# Patient Record
Sex: MALE | Race: BLACK OR AFRICAN AMERICAN | NOT HISPANIC OR LATINO | Employment: FULL TIME | URBAN - METROPOLITAN AREA
[De-identification: names, ages, dates, MRNs, and addresses within clinical notes are randomized per-mention and may not be internally consistent; named-entity substitution may affect disease eponyms.]

---

## 2023-10-19 ENCOUNTER — OFFICE VISIT (OUTPATIENT)
Dept: FAMILY MEDICINE CLINIC | Facility: CLINIC | Age: 28
End: 2023-10-19
Payer: COMMERCIAL

## 2023-10-19 VITALS
OXYGEN SATURATION: 98 % | SYSTOLIC BLOOD PRESSURE: 112 MMHG | DIASTOLIC BLOOD PRESSURE: 72 MMHG | WEIGHT: 188.2 LBS | HEIGHT: 66 IN | BODY MASS INDEX: 30.25 KG/M2 | TEMPERATURE: 98.9 F | HEART RATE: 80 BPM

## 2023-10-19 DIAGNOSIS — M99.03 SOMATIC DYSFUNCTION OF LUMBAR REGION: ICD-10-CM

## 2023-10-19 DIAGNOSIS — K21.9 GASTROESOPHAGEAL REFLUX DISEASE WITHOUT ESOPHAGITIS: Primary | ICD-10-CM

## 2023-10-19 DIAGNOSIS — G89.29 CHRONIC LEFT-SIDED LOW BACK PAIN WITHOUT SCIATICA: ICD-10-CM

## 2023-10-19 DIAGNOSIS — M99.04 SOMATIC DYSFUNCTION OF SACRAL REGION: ICD-10-CM

## 2023-10-19 DIAGNOSIS — M99.02 SOMATIC DYSFUNCTION OF THORACIC REGION: ICD-10-CM

## 2023-10-19 DIAGNOSIS — M54.50 CHRONIC LEFT-SIDED LOW BACK PAIN WITHOUT SCIATICA: ICD-10-CM

## 2023-10-19 RX ORDER — PANTOPRAZOLE SODIUM 40 MG/1
40 TABLET, DELAYED RELEASE ORAL DAILY
Qty: 30 TABLET | Refills: 2 | Status: SHIPPED | OUTPATIENT
Start: 2023-10-19

## 2023-10-19 NOTE — PROGRESS NOTES
New Patient Office Visit      Patient Name: Bailey Christiansen  : 1995   MRN: [id number]     Chief Complaint:    Chief Complaint   Patient presents with    Annual Exam     Establish care, patient use to see a PCP at      Back Pain     L side mid/ low. Patient had an injury at work about 2 years ago.        History of Present Illness: Bailey Christiansen is a 28 y.o. male who is here today to establish care.  Left abdominal pain and low back pain.    Left upper abdomen - feels like a balloon . Been going on for a while. Comes and goes.maybe worse after snacks. Worse in morning. Feels like something is pushing up. Lasts 3-4 hours. Just started a gas pill. He burped a lot. It helped some.     Low back pain - felt like he had worms because of the movement in his back. Has been doing running and gym workouts. Works at [employer] and feels great. Says sometimes his left side locks. Sometimes when having a stool, it made the back pain worse. He strained his muscle work a long times ago. He did get a shot in the back before and did help. He was given pills and referred physical therapy. Now pain is like /10.     Physical exam: Somatic function noted in lumbar region.  Somatic function noted in hip region.  Dysfunction noted in thoracic region.  Mood and affect appropriate.  Heart exam RRR.  Lung exam CTA bilateral.  Abdominal exam shows normal bowel sounds.  No tenderness to abdominal palpation.      Subjective          Past Medical History: History reviewed. No pertinent past medical history.    Past Surgical History: History reviewed. No pertinent surgical history.    Family History:   Family History   Problem Relation Age of Onset    Hypertension Mother        Social History:   Social History     Socioeconomic History    Marital status: Single   Tobacco Use    Smoking status: Never    Smokeless tobacco: Never   Vaping Use    Vaping Use: Never used   Substance and Sexual Activity    Alcohol use: Yes     Comment:  "Ocassionally    Drug use: Never    Sexual activity: Defer       Medications:     Current Outpatient Medications:     pantoprazole (Protonix) 40 MG EC tablet, Take 1 tablet by mouth Daily., Disp: 30 tablet, Rfl: 2    Allergies:   No Known Allergies    Objective     Physical Exam: Please see above  Vital Signs:   Vitals:    10/19/23 0844   BP: 112/72   Pulse: 80   Temp: 98.9 °F (37.2 °C)   TempSrc: Infrared   SpO2: 98%   Weight: 85.4 kg (188 lb 3.2 oz)   Height: 167.6 cm (66\")   PainSc:   1   PainLoc: Back     Body mass index is 30.38 kg/m².       Assessment / Plan      Assessment/Plan:   Diagnoses and all orders for this visit:    1. Gastroesophageal reflux disease without esophagitis (Primary)  -     pantoprazole (Protonix) 40 MG EC tablet; Take 1 tablet by mouth Daily.  Dispense: 30 tablet; Refill: 2    2. Chronic left-sided low back pain without sciatica    3. Somatic dysfunction of thoracic region    4. Somatic dysfunction of lumbar region    5. Somatic dysfunction of sacral region         GERD-likely causing his upper abdominal pain.  Reviewed diet today and told patient to try to avoid certain foods and then reintroduce.  This may take time and he will have to work on this going forward.  Recommend Protonix for the next 1 to 3 months.  Next visit we may try to reduce the dose or switch him to Pepcid  Chronic back pain-looks like it is mostly from his hip region.  Patient has somatic function noted in thoracic, lumbar, and hip region.  Discussed at home physical therapy for stretching and strengthening.  Discussed possible medications.  Symptoms have improved substantially.  OMT performed: Performed HVLA to thoracic region, lumbar region, and hip region.  Patient had slight improvement in pain.  No acute complications.      Follow Up:   Return in about 2 months (around 12/19/2023).    Josesito Kwok DO  [facility] [place]     "

## 2024-01-17 ENCOUNTER — OFFICE VISIT (OUTPATIENT)
Dept: FAMILY MEDICINE CLINIC | Facility: CLINIC | Age: 29
End: 2024-01-17
Payer: COMMERCIAL

## 2024-01-17 VITALS
WEIGHT: 182 LBS | SYSTOLIC BLOOD PRESSURE: 104 MMHG | TEMPERATURE: 98.6 F | OXYGEN SATURATION: 100 % | HEIGHT: 66 IN | DIASTOLIC BLOOD PRESSURE: 72 MMHG | HEART RATE: 64 BPM | BODY MASS INDEX: 29.25 KG/M2

## 2024-01-17 DIAGNOSIS — M99.02 SOMATIC DYSFUNCTION OF THORACIC REGION: ICD-10-CM

## 2024-01-17 DIAGNOSIS — R10.12 LEFT UPPER QUADRANT ABDOMINAL PAIN: Primary | ICD-10-CM

## 2024-01-17 DIAGNOSIS — M99.08 SOMATIC DYSFUNCTION OF RIB CAGE REGION: ICD-10-CM

## 2024-01-17 RX ORDER — PANTOPRAZOLE SODIUM 20 MG/1
20 TABLET, DELAYED RELEASE ORAL DAILY
Qty: 30 TABLET | Refills: 0 | Status: SHIPPED | OUTPATIENT
Start: 2024-01-17

## 2024-01-17 NOTE — PROGRESS NOTES
"     Follow Up Office Visit      Patient Name: Bailey Christiansen  : 1995   MRN: [id number]     Chief Complaint:    Chief Complaint   Patient presents with    Back Pain       History of Present Illness: Bailey Christiansen is a 29 y.o. male who is here today to follow up with back pain and upper abdominal discomfort that he calls a pressure or bloating.  He reports that he does have a lot of gassiness as far as burping and flatulence.  Patient says that his flatulence thinks.  Patient reports that the antiacid medication may have helped some.  His chronic back pain is recurrent and intermittent.  Patient says he is dealing with it and it is fairly stable.  No recent injuries.  Does at home stretching and exercises daily.  Has not been to the chiropractor or PT recently.  Says that he does not really have any heartburn symptoms.      Physical exam: Somatic function noted in lumbar region, thoracic region, rib regions.  Abdominal exam shows somatic function in the abdominal region.  No tenderness palpation of the upper quadrant.      Subjective        I have reviewed and the following portions of the patient's history were updated as appropriate: past family history, past medical history, past social history, past surgical history and problem list.    Medications:     Current Outpatient Medications:     pantoprazole (Protonix) 20 MG EC tablet, Take 1 tablet by mouth Daily., Disp: 30 tablet, Rfl: 0    Allergies:   No Known Allergies    Objective     Physical Exam: Please see above  Vital Signs:   Vitals:    24 1428   BP: 104/72   Pulse: 64   Temp: 98.6 °F (37 °C)   SpO2: 100%   Weight: 82.6 kg (182 lb)   Height: 167.6 cm (66\")     Body mass index is 29.38 kg/m².          Assessment / Plan      Assessment/Plan:   Diagnoses and all orders for this visit:    1. Left upper quadrant abdominal pain (Primary)  -     XR Abdomen KUB; Future  -     pantoprazole (Protonix) 20 MG EC tablet; Take 1 tablet by mouth Daily.  " Dispense: 30 tablet; Refill: 0    2. Somatic dysfunction of thoracic region    3. Somatic dysfunction of rib cage region    Since the patient still having persistent bloating feeling-we will get a KUB look at gas pattern.  Consider ultrasound in the future.  May also send patient to GI for further evaluation.  I would like the patient needs a colonoscopy or endoscopy at this time as patient not having any red flag symptoms.  Monitor symptoms going forward and follow-up in 3 months.  Patient to wean off of pantoprazole and start Gas-X.    Somatic function noted.  Discussed OMT today and performed HVLA to rib region and to thoracic region.  Patient tolerated well without any acute complications.  Mild improvement in pain and range of motion.    For patient's back pain-this could be related to his stomach bloating issue.  Recommend going to the chiropractor, doing at home stretches and exercises/physical therapy for upper back/thoracic, lumbar/lower back, and ribs.  He should perform these exercises twice per week.  Also recommend doing foam rolling after exercising.          Follow Up:   Return in about 3 months (around 4/17/2024).    Josesito Kwok,   [facility] [place]

## 2024-01-19 ENCOUNTER — TELEPHONE (OUTPATIENT)
Dept: FAMILY MEDICINE CLINIC | Facility: CLINIC | Age: 29
End: 2024-01-19

## 2024-01-19 NOTE — TELEPHONE ENCOUNTER
Hub staff attempted to follow warm transfer process and was unsuccessful     Caller: Bailey Christiansen    Relationship to patient: Self    Best call back number:     [phone] (Home)     Patient is needing:     PATIENT MADE CONTACT TO RETURN A MISSED CALL FROM THE OFFICE - JAVIER    PLEASE CONTACT PATIENT VIA [facility]    IF PATIENT IS NOT AVAILABLE FOR CALL BACK, PLEASE LEAVE A VOICEMAIL

## 2024-01-22 ENCOUNTER — TELEPHONE (OUTPATIENT)
Dept: FAMILY MEDICINE CLINIC | Facility: CLINIC | Age: 29
End: 2024-01-22
Payer: COMMERCIAL

## 2024-01-22 NOTE — TELEPHONE ENCOUNTER
HUB TO RELAY    LVM        Per Dr. Kwok-     The patient's x-ray confirm that he has a large amount of constipation.  This type of issue can cause a the symptoms he is experiencing.  He should take MiraLAX every day.  Patient should continue taking MiraLAX every day until his next follow-up appointment.  If patient's symptoms do not begin to improve over the next month or 2, then he should follow-up sooner than his next appointment so we can adjust the treatment.

## 2024-01-25 NOTE — TELEPHONE ENCOUNTER
Name: Bailey Christiansen    Relationship: Self    Best Callback Number:       [phone] (Home)     HUB PROVIDED THE RELAY MESSAGE FROM THE OFFICE   PATIENT     VOICED UNDERSTANDING AND HAS NO FURTHER QUESTIONS AT THIS TIME